# Patient Record
Sex: FEMALE | Race: OTHER | URBAN - METROPOLITAN AREA
[De-identification: names, ages, dates, MRNs, and addresses within clinical notes are randomized per-mention and may not be internally consistent; named-entity substitution may affect disease eponyms.]

---

## 2020-04-02 ENCOUNTER — INPATIENT (INPATIENT)
Facility: HOSPITAL | Age: 35
LOS: 0 days | Discharge: HOME | DRG: 831 | End: 2020-04-03
Attending: OBSTETRICS & GYNECOLOGY | Admitting: OBSTETRICS & GYNECOLOGY
Payer: COMMERCIAL

## 2020-04-02 VITALS
HEART RATE: 117 BPM | WEIGHT: 184.09 LBS | HEIGHT: 63 IN | OXYGEN SATURATION: 94 % | DIASTOLIC BLOOD PRESSURE: 90 MMHG | TEMPERATURE: 98 F | SYSTOLIC BLOOD PRESSURE: 139 MMHG | RESPIRATION RATE: 18 BRPM

## 2020-04-02 DIAGNOSIS — Z98.82 BREAST IMPLANT STATUS: Chronic | ICD-10-CM

## 2020-04-02 DIAGNOSIS — Z98.891 HISTORY OF UTERINE SCAR FROM PREVIOUS SURGERY: Chronic | ICD-10-CM

## 2020-04-02 LAB
ALBUMIN SERPL ELPH-MCNC: 3.1 G/DL — LOW (ref 3.3–5)
ALBUMIN SERPL ELPH-MCNC: 3.2 G/DL — LOW (ref 3.3–5)
ALP SERPL-CCNC: 142 U/L — HIGH (ref 40–120)
ALP SERPL-CCNC: 146 U/L — HIGH (ref 40–120)
ALT FLD-CCNC: 31 U/L — SIGNIFICANT CHANGE UP (ref 10–45)
ALT FLD-CCNC: SIGNIFICANT CHANGE UP U/L (ref 10–45)
ANION GAP SERPL CALC-SCNC: 11 MMOL/L — SIGNIFICANT CHANGE UP (ref 5–17)
ANION GAP SERPL CALC-SCNC: 12 MMOL/L — SIGNIFICANT CHANGE UP (ref 5–17)
APTT BLD: 35 SEC — SIGNIFICANT CHANGE UP (ref 27.5–36.3)
AST SERPL-CCNC: 41 U/L — HIGH (ref 10–40)
AST SERPL-CCNC: SIGNIFICANT CHANGE UP U/L (ref 10–40)
BASOPHILS # BLD AUTO: 0.01 K/UL — SIGNIFICANT CHANGE UP (ref 0–0.2)
BASOPHILS NFR BLD AUTO: 0.2 % — SIGNIFICANT CHANGE UP (ref 0–2)
BILIRUB SERPL-MCNC: 0.2 MG/DL — SIGNIFICANT CHANGE UP (ref 0.2–1.2)
BILIRUB SERPL-MCNC: 0.2 MG/DL — SIGNIFICANT CHANGE UP (ref 0.2–1.2)
BLD GP AB SCN SERPL QL: NEGATIVE — SIGNIFICANT CHANGE UP
BUN SERPL-MCNC: 5 MG/DL — LOW (ref 7–23)
BUN SERPL-MCNC: 6 MG/DL — LOW (ref 7–23)
CALCIUM SERPL-MCNC: 8.2 MG/DL — LOW (ref 8.4–10.5)
CALCIUM SERPL-MCNC: 8.4 MG/DL — SIGNIFICANT CHANGE UP (ref 8.4–10.5)
CHLORIDE SERPL-SCNC: 102 MMOL/L — SIGNIFICANT CHANGE UP (ref 96–108)
CHLORIDE SERPL-SCNC: 103 MMOL/L — SIGNIFICANT CHANGE UP (ref 96–108)
CO2 SERPL-SCNC: 22 MMOL/L — SIGNIFICANT CHANGE UP (ref 22–31)
CO2 SERPL-SCNC: 22 MMOL/L — SIGNIFICANT CHANGE UP (ref 22–31)
CREAT SERPL-MCNC: 0.5 MG/DL — SIGNIFICANT CHANGE UP (ref 0.5–1.3)
CREAT SERPL-MCNC: 0.52 MG/DL — SIGNIFICANT CHANGE UP (ref 0.5–1.3)
CRP SERPL-MCNC: 8.78 MG/DL — HIGH (ref 0–0.4)
D DIMER BLD IA.RAPID-MCNC: 502 NG/ML DDU — HIGH
EOSINOPHIL # BLD AUTO: 0 K/UL — SIGNIFICANT CHANGE UP (ref 0–0.5)
EOSINOPHIL NFR BLD AUTO: 0 % — SIGNIFICANT CHANGE UP (ref 0–6)
FERRITIN SERPL-MCNC: 225 NG/ML — HIGH (ref 15–150)
GLUCOSE SERPL-MCNC: 94 MG/DL — SIGNIFICANT CHANGE UP (ref 70–99)
GLUCOSE SERPL-MCNC: 99 MG/DL — SIGNIFICANT CHANGE UP (ref 70–99)
HCT VFR BLD CALC: 41.1 % — SIGNIFICANT CHANGE UP (ref 34.5–45)
HGB BLD-MCNC: 13.7 G/DL — SIGNIFICANT CHANGE UP (ref 11.5–15.5)
IMM GRANULOCYTES NFR BLD AUTO: 0.3 % — SIGNIFICANT CHANGE UP (ref 0–1.5)
INR BLD: 0.96 — SIGNIFICANT CHANGE UP (ref 0.88–1.16)
LACTATE SERPL-SCNC: 1.4 MMOL/L — SIGNIFICANT CHANGE UP (ref 0.5–2)
LEGIONELLA AG UR QL: NEGATIVE — SIGNIFICANT CHANGE UP
LYMPHOCYTES # BLD AUTO: 1.32 K/UL — SIGNIFICANT CHANGE UP (ref 1–3.3)
LYMPHOCYTES # BLD AUTO: 21.3 % — SIGNIFICANT CHANGE UP (ref 13–44)
MCHC RBC-ENTMCNC: 31.8 PG — SIGNIFICANT CHANGE UP (ref 27–34)
MCHC RBC-ENTMCNC: 33.3 GM/DL — SIGNIFICANT CHANGE UP (ref 32–36)
MCV RBC AUTO: 95.4 FL — SIGNIFICANT CHANGE UP (ref 80–100)
MONOCYTES # BLD AUTO: 0.19 K/UL — SIGNIFICANT CHANGE UP (ref 0–0.9)
MONOCYTES NFR BLD AUTO: 3.1 % — SIGNIFICANT CHANGE UP (ref 2–14)
NEUTROPHILS # BLD AUTO: 4.67 K/UL — SIGNIFICANT CHANGE UP (ref 1.8–7.4)
NEUTROPHILS NFR BLD AUTO: 75.1 % — SIGNIFICANT CHANGE UP (ref 43–77)
NRBC # BLD: 0 /100 WBCS — SIGNIFICANT CHANGE UP (ref 0–0)
PCO2 BLDA: SIGNIFICANT CHANGE UP MMHG (ref 32–45)
PCO2 BLDV: 36 MMHG — LOW (ref 41–51)
PH BLDA: SIGNIFICANT CHANGE UP (ref 7.35–7.45)
PH BLDV: 7.4 — SIGNIFICANT CHANGE UP (ref 7.32–7.43)
PLATELET # BLD AUTO: 208 K/UL — SIGNIFICANT CHANGE UP (ref 150–400)
PO2 BLDA: SIGNIFICANT CHANGE UP MMHG (ref 83–108)
PO2 BLDV: 56 MMHG — SIGNIFICANT CHANGE UP
POTASSIUM SERPL-MCNC: 3.5 MMOL/L — SIGNIFICANT CHANGE UP (ref 3.5–5.3)
POTASSIUM SERPL-MCNC: SIGNIFICANT CHANGE UP MMOL/L (ref 3.5–5.3)
POTASSIUM SERPL-SCNC: 3.5 MMOL/L — SIGNIFICANT CHANGE UP (ref 3.5–5.3)
POTASSIUM SERPL-SCNC: SIGNIFICANT CHANGE UP MMOL/L (ref 3.5–5.3)
PROT SERPL-MCNC: 6.5 G/DL — SIGNIFICANT CHANGE UP (ref 6–8.3)
PROT SERPL-MCNC: 7 G/DL — SIGNIFICANT CHANGE UP (ref 6–8.3)
PROTHROM AB SERPL-ACNC: 10.9 SEC — SIGNIFICANT CHANGE UP (ref 10–12.9)
RBC # BLD: 4.31 M/UL — SIGNIFICANT CHANGE UP (ref 3.8–5.2)
RBC # FLD: 12.4 % — SIGNIFICANT CHANGE UP (ref 10.3–14.5)
RH IG SCN BLD-IMP: POSITIVE — SIGNIFICANT CHANGE UP
RH IG SCN BLD-IMP: POSITIVE — SIGNIFICANT CHANGE UP
SAO2 % BLDA: SIGNIFICANT CHANGE UP % (ref 95–100)
SAO2 % BLDV: 88 % — SIGNIFICANT CHANGE UP
SODIUM SERPL-SCNC: 136 MMOL/L — SIGNIFICANT CHANGE UP (ref 135–145)
SODIUM SERPL-SCNC: 136 MMOL/L — SIGNIFICANT CHANGE UP (ref 135–145)
WBC # BLD: 6.21 K/UL — SIGNIFICANT CHANGE UP (ref 3.8–10.5)
WBC # FLD AUTO: 6.21 K/UL — SIGNIFICANT CHANGE UP (ref 3.8–10.5)

## 2020-04-02 PROCEDURE — 71045 X-RAY EXAM CHEST 1 VIEW: CPT | Mod: 26

## 2020-04-02 PROCEDURE — 93010 ELECTROCARDIOGRAM REPORT: CPT

## 2020-04-02 PROCEDURE — 99285 EMERGENCY DEPT VISIT HI MDM: CPT | Mod: 25

## 2020-04-02 PROCEDURE — 93976 VASCULAR STUDY: CPT | Mod: 26

## 2020-04-02 PROCEDURE — 99223 1ST HOSP IP/OBS HIGH 75: CPT

## 2020-04-02 RX ORDER — FAMOTIDINE 10 MG/ML
20 INJECTION INTRAVENOUS DAILY
Refills: 0 | Status: DISCONTINUED | OUTPATIENT
Start: 2020-04-02 | End: 2020-04-03

## 2020-04-02 RX ORDER — ACETAMINOPHEN 500 MG
1000 TABLET ORAL EVERY 8 HOURS
Refills: 0 | Status: DISCONTINUED | OUTPATIENT
Start: 2020-04-02 | End: 2020-04-03

## 2020-04-02 RX ORDER — FOLIC ACID 0.8 MG
1 TABLET ORAL DAILY
Refills: 0 | Status: DISCONTINUED | OUTPATIENT
Start: 2020-04-02 | End: 2020-04-03

## 2020-04-02 RX ORDER — AZITHROMYCIN 500 MG/1
500 TABLET, FILM COATED ORAL ONCE
Refills: 0 | Status: COMPLETED | OUTPATIENT
Start: 2020-04-02 | End: 2020-04-02

## 2020-04-02 RX ORDER — FERROUS SULFATE 325(65) MG
325 TABLET ORAL DAILY
Refills: 0 | Status: DISCONTINUED | OUTPATIENT
Start: 2020-04-02 | End: 2020-04-03

## 2020-04-02 RX ADMIN — Medication 1 MILLIGRAM(S): at 18:52

## 2020-04-02 RX ADMIN — AZITHROMYCIN 255 MILLIGRAM(S): 500 TABLET, FILM COATED ORAL at 17:15

## 2020-04-02 RX ADMIN — Medication 1 TABLET(S): at 18:53

## 2020-04-02 RX ADMIN — Medication 325 MILLIGRAM(S): at 18:52

## 2020-04-02 RX ADMIN — Medication 1000 MILLIGRAM(S): at 21:05

## 2020-04-02 RX ADMIN — FAMOTIDINE 20 MILLIGRAM(S): 10 INJECTION INTRAVENOUS at 19:45

## 2020-04-02 NOTE — H&P ADULT - NSHPLABSRESULTS_GEN_ALL_CORE
13.7   6.21  )-----------( 208      ( 02 Apr 2020 10:57 )             41.1     Comprehensive Metabolic Panel (04.02.20 @ 10:57)    Sodium, Serum: 136 mmol/L    Potassium, Serum: see note: sample hemolzyed mmol/L    Chloride, Serum: 102 mmol/L    Carbon Dioxide, Serum: 22 mmol/L    Anion Gap, Serum: 12 mmol/L    Blood Urea Nitrogen, Serum: 5 mg/dL    Creatinine, Serum: 0.52 mg/dL    Glucose, Serum: 94 mg/dL    Calcium, Total Serum: 8.4 mg/dL    Protein Total, Serum: 7.0 g/dL    Albumin, Serum: 3.2 g/dL    Bilirubin Total, Serum: 0.2 mg/dL    Alkaline Phosphatase, Serum: 146 U/L    Aspartate Aminotransferase (AST/SGOT): see note: sample hemolzyed U/L    Alanine Aminotransferase (ALT/SGPT): see ntoe: sample hemolzyed U/L     < from: Xray Chest 1 View-PORTABLE IMMEDIATE (04.02.20 @ 10:35) >    FINDINGS: No cardiomegaly. No pleural effusion. No pneumothorax. Large breasts with bilateral implants are limiting assessment of underlying lung fields. However suspect underlying lung opacification particularly peripherally in the bilateral mid and lower zones.    IMPRESSION:  Study is limited by patient's large breasts with implants however suspect bilateral mid and lower zone lung opacification which is suspicious for  Covid  19 infection.. Findings can be correlated with CT.        < end of copied text >

## 2020-04-02 NOTE — H&P ADULT - HISTORY OF PRESENT ILLNESS
Patient evaluated at bedside.   36yo  @ 30.0wk with RICCO 20 resenting to ED for 9 days of body aches, subjective fevers and cough. Patient denies contact with covid+ however reports that her sister has experienced similar symptoms and that she may have had some positive exposure. Patient reporting shortness of breath upon arrival to ED. Patient also experiencing inability to smell or taste for a week.    Patient denies abdominal pain, contractions, nausea, vomiting, vaginal bleeding, LOF. Reporting normal fetal movement.     course with Dr. Conner out of [facility]. Patient reporting passing GCT, otherwise uncomplicated pregnancy. Patient has not missed appointment with Ob. Patient denies h/o elevated BP this pregnancy    OBHx: G1- c/s 2017 at [facility] c/b gHTN never on antihypertensives, no h/o PEC  G2-current  GYN Hx: denies F/C/STIs  PMHx: denies  SHx: c/s x 1, breast augmentation   Meds: PNV  Allergies: NKDA

## 2020-04-02 NOTE — CONSULT NOTE ADULT - SUBJECTIVE AND OBJECTIVE BOX
HPI:  Patient evaluated at bedside.   34yo  @ 30.0wk with RICCO 20 resenting to ED for 9 days of body aches, subjective fevers and cough. Patient denies contact with covid+ however reports that her sister has experienced similar symptoms and that she may have had some positive exposure. Patient reporting shortness of breath upon arrival to ED. Patient also experiencing inability to smell or taste for a week.    Patient denies abdominal pain, contractions, nausea, vomiting, vaginal bleeding, LOF. Reporting normal fetal movement.     course with Dr. Conner out of [facility]. Patient reporting passing GCT, otherwise uncomplicated pregnancy. Patient has not missed appointment with Ob. Patient denies h/o elevated BP this pregnancy    OBHx: G1- c/s 2017 at [facility] c/b gHTN never on antihypertensives, no h/o PEC  G2-current  GYN Hx: denies F/C/STIs  PMHx: denies  SHx: c/s x 1, breast augmentation   Meds: PNV  Allergies: NKDA (2020 12:38)      PAST MEDICAL & SURGICAL HISTORY:  H/O breast augmentation  H/O  section        REVIEW OF SYSTEMS:    General:	 no weakness; no fevers, no chills  Skin/Breast: no rash  Respiratory and Thorax: + SOB, no cough  Cardiovascular:	No chest pain  Gastrointestinal:	 no nausea, vomiting , diarrhea  Genitourinary:	no dysuria, no difficulty urinating, no hematuria  Musculoskeletal:	no weakness, no joint swelling/pain  Neurological:	no focal weakness/numbness  Endocrine:	no polyuria, no polydipsia      ANTIBIOTICS:  MEDICATIONS  (STANDING):  ferrous    sulfate 325 milliGRAM(s) Oral daily  folic acid 1 milliGRAM(s) Oral daily  prenatal multivitamin 1 Tablet(s) Oral daily    MEDICATIONS  (PRN):      Allergies    No Known Allergies    Intolerances        SOCIAL HISTORY:    FAMILY HISTORY:      Vital Signs Last 24 Hrs  T(C): 36.8 (2020 11:28), Max: 36.8 (2020 09:17)  T(F): 98.3 (2020 11:28), Max: 98.3 (2020 09:17)  HR: 103 (2020 11:28) (103 - 117)  BP: 116/72 (2020 11:28) (116/72 - 139/90)  BP(mean): --  RR: 18 (2020 11:28) (18 - 18)  SpO2: 96% (2020 11:28) (94% - 96%)    PHYSICAL EXAM:  Constitutional:Well-developed, well nourished  Eyes:ROBERT, EOMI  Ear/Nose/Throat: no oral lesion, no sinus tenderness on percussion	  Neck:no JVD, no lymphadenopathy, supple  Respiratory: CTA maikol  Cardiovascular: S1S2 RRR, no murmurs  Gastrointestinal:soft, (+) BS, no HSM  Extremities:no e/e/c  Vascular: DP Pulse:	right normal; left normal            LABS:                        13.7   6.21  )-----------( 208      ( 2020 10:57 )             41.1     04-    136  |  103  |  6<L>  ----------------------------<  99  3.5   |  22  |  0.50    Ca    8.2<L>      2020 12:29    TPro  6.5  /  Alb  3.1<L>  /  TBili  0.2  /  DBili  x   /  AST  41<H>  /  ALT  31  /  AlkPhos  142<H>  04-02    PT/INR - ( 2020 10:57 )   PT: 10.9 sec;   INR: 0.96          PTT - ( 2020 10:57 )  PTT:35.0 sec      MICROBIOLOGY: pending  RADIOLOGY & ADDITIONAL STUDIES: CXR b/l opacities

## 2020-04-02 NOTE — H&P ADULT - ASSESSMENT
36yo  @ 30.0wk with RICCO 20 resenting to ED for 9 days of body aches, subjective fevers and cough r/o Covid-19 infection. Patient currently saturating 95-96% on 3LPM NC with some increased respirations. Patient currently afebrile. High suspicion given clinical picture however labs and CXR pending. Overall reassuring fetal status, given patients increased O2 demand   -admit to antepartum unit for supportive care  -Covid 19 testing pending   -VS q 4 hours  -Oxygen therapy as needed  -ID consult- appreciate recommendations  -MFM consulted  -NST TID    ** will hold on obstretical intervention at this time as fetal status reassuring   d/w Dr. Casillas & Dr. Christiansen [facility]

## 2020-04-02 NOTE — CONSULT NOTE ADULT - SUBJECTIVE AND OBJECTIVE BOX
Patient evaluated at bedside.   34yo  @ 32.6wk with RICCO 20 resenting to ED for 3 days of body aches, subjective fevers and cough. Patient denies contact with covid+ however reports that her sister has experienced similar symptoms and that she may have had some positive exposure. Patient reporting shortness of breath upon arrival to ED. Patient also experiencing inability to smell or taste for a week.    Patient denies abdominal pain, contractions, nausea, vomiting, vaginal bleeding, LOF. Reporting normal fetal movement.     course with Dr. Conner out of [facility]. Patient reporting passing GCT, otherwise uncomplicated pregnancy. Patient has not missed appointment with Ob. Patient denies h/o elevated BP this pregnancy    OBHx: G1- c/s 2017 at [facility] c/b gHTN never on antihypertensives, no h/o PEC  G2-current  GYN Hx: denies F/C/STIs  PMHx: denies  SHx: c/s x 1  Meds: PNV  Allergies: NKDA    PHYSICAL EXAM:   Vital Signs Last 24 Hrs  T(C): 36.8 (2020 09:17), Max: 36.8 (2020 09:17)  T(F): 98.3 (2020 09:17), Max: 98.3 (2020 09:17)  HR: 117 (2020 09:17) (117 - 117)  BP: 139/90 (2020 09:17) (139/90 - 139/90)  BP(mean): --  RR: 18 (2020 09:17) (18 - 18)  SpO2: 94% (2020 09:17) (94% - 94%)    **************************  Constitutional: Alert & Oriented x3, No acute distress  Respiratory: Patient taking shallow breaths, no accessory muscles used for inspiration   Cardiovascular: tachycardic to 117  Gastrointestinal: soft, gravid non tender, positive bowel sounds, no rebound or guarding   Pelvic exam: deferred  TAUS bedside: cephalic, TIMI 12.3, anterior placenta,  on Doppler, BPP 6/6, await bedside NST  Extremities: no calf tenderness or swelling      LABS: pending                   RADIOLOGY & ADDITIONAL STUDIES: Patient evaluated at bedside.   36yo  @ 30.0wk with RICCO 20 resenting to ED for 9 days of body aches, subjective fevers and cough. Patient denies contact with covid+ however reports that her sister has experienced similar symptoms and that she may have had some positive exposure. Patient reporting shortness of breath upon arrival to ED. Patient also experiencing inability to smell or taste for a week.    Patient denies abdominal pain, contractions, nausea, vomiting, vaginal bleeding, LOF. Reporting normal fetal movement.     course with Dr. Conner out of [facility]. Patient reporting passing GCT, otherwise uncomplicated pregnancy. Patient has not missed appointment with Ob. Patient denies h/o elevated BP this pregnancy    OBHx: G1- c/s 2017 at [facility] c/b gHTN never on antihypertensives, no h/o PEC  G2-current  GYN Hx: denies F/C/STIs  PMHx: denies  SHx: c/s x 1, breast augmentation   Meds: PNV  Allergies: NKDA    PHYSICAL EXAM:   Vital Signs Last 24 Hrs  T(C): 36.8 (2020 09:17), Max: 36.8 (2020 09:17)  T(F): 98.3 (2020 09:17), Max: 98.3 (2020 09:17)  HR: 117 (2020 09:17) (117 - 117)  BP: 139/90 (2020 09:17) (139/90 - 139/90)  BP(mean): --  RR: 18 (2020 09:17) (18 - 18)  SpO2: 94% (2020 09:17) (94% - 94%)    **************************  Constitutional: Alert & Oriented x3, No acute distress  Respiratory: Patient taking shallow breaths, no accessory muscles used for inspiration   Cardiovascular: tachycardic to 117  Gastrointestinal: soft, gravid non tender, positive bowel sounds, no rebound or guarding   Pelvic exam: deferred  TAUS bedside: cephalic, TIMI 12.3, anterior placenta,  on Doppler, BPP 8/8  NST: baseline 140 bpm, moderate variability, accels present, no decels noted- 30 min NST performed   Extremities: no calf tenderness or swelling      LABS: pending                   RADIOLOGY & ADDITIONAL STUDIES:

## 2020-04-02 NOTE — CONSULT NOTE ADULT - ASSESSMENT
34yo  @ 32.6wk with RICCO 20 resenting to ED for 3 days of body aches, subjective fevers and cough r/o Covid-19 infection. Patient currently saturating 95-96% on 3LPM NC with some increased respirations. Patient currently afebrile. High suspicion given clinical picture however labs and CXR pending. Overall reassuring fetal status 34yo  @ 32.6wk with RICCO 20 resenting to ED for 9 days of body aches, subjective fevers and cough r/o Covid-19 infection. Patient currently saturating 95-96% on 3LPM NC with some increased respirations. Patient currently afebrile. High suspicion given clinical picture however labs and CXR pending. Overall reassuring fetal status, given patients increased o2 demand would reccomend getting Covid team on board and anticipate admission for supportive care and treatment     d/w Dr. Casillas

## 2020-04-02 NOTE — ED PROVIDER NOTE - PROGRESS NOTE DETAILS
Ob contacted and case discussed. Will send someone to ED to [facility]. CXR c/w likely covid.  Pt w hemolyzed cmp - repeat pending.  Pt seen by and discussed w ob/gyn - pt tba to gyn, 6th floor.  GYN requests abg - will draw, gyn to fu on results.

## 2020-04-02 NOTE — ED ADULT NURSE NOTE - OBJECTIVE STATEMENT
34 y/o female, , 32 weeks pregnant presents to the ED c/o 6 days of body aches associated w/ subjective fever, mild SOB, and dry cough. Denies chills, abd pain, NVD, back pain, vaginal bleeding, and any other sx.

## 2020-04-02 NOTE — ED PROVIDER NOTE - OBJECTIVE STATEMENT
36 y/o F  32 wks pregnant has 6 dys of body ache, 3dys of fever and SOB, and today has dry cough. No abd pain, N/V/D, fluid leakage, bleeding. Pt is able to feel baby move. No complications with pregnancy. Due date is on . No sick contact or recent travel. 34 y/o F  30 wks pregnant c/o 6 d myalgias, chills, 3d fever and SOB, and today has dry cough. No abd pain, N/V/D, vaginal fluid leakage, vaginal bleeding. Pt is able to feel baby move. No complications with pregnancy. Due date is on . No sick contact or recent travel.  Pt took tylenol last pm w relief.

## 2020-04-02 NOTE — CONSULT NOTE ADULT - ASSESSMENT
34 yo female at 30 weeks gestation, p/w 10 days of cough, fever, myalgia, CXR b/l infiltrates all suspicious for COVID-19 infection. Clinically improving.  - f/u COVID-19 PCR  - check urine Legionella Ag and give azithromycin 500mg IV X 1; if Legionella Ag negative, no need to redose  - supportive care; risks of hydroxychloroquine outweigh benefits    ID Team 2

## 2020-04-02 NOTE — ED PROVIDER NOTE - DIAGNOSTIC INTERPRETATION
b/l infiltrate. limited by breath. nml heart and bones. ER Physician:  Brenda Director  CHEST XRAY INTERPRETATION: lungs w poss bilat lower lobe infiltrates vs breast implants, heart shadow normal, bony structures intact

## 2020-04-02 NOTE — ED PROVIDER NOTE - CLINICAL SUMMARY MEDICAL DECISION MAKING FREE TEXT BOX
34 y/o F  32 wks pregnant p/w COVID like sxs. Stat 94% on room air upon arrival. Will obtain labs, cxr, covid swab, and ob consult. Fetal heart rate is 164 bpm.

## 2020-04-03 VITALS — TEMPERATURE: 99 F

## 2020-04-03 LAB — SARS-COV-2 RNA SPEC QL NAA+PROBE: DETECTED

## 2020-04-03 PROCEDURE — 82803 BLOOD GASES ANY COMBINATION: CPT

## 2020-04-03 PROCEDURE — 85610 PROTHROMBIN TIME: CPT

## 2020-04-03 PROCEDURE — 83605 ASSAY OF LACTIC ACID: CPT

## 2020-04-03 PROCEDURE — 86140 C-REACTIVE PROTEIN: CPT

## 2020-04-03 PROCEDURE — 93976 VASCULAR STUDY: CPT

## 2020-04-03 PROCEDURE — 87449 NOS EACH ORGANISM AG IA: CPT

## 2020-04-03 PROCEDURE — 87389 HIV-1 AG W/HIV-1&-2 AB AG IA: CPT

## 2020-04-03 PROCEDURE — 99285 EMERGENCY DEPT VISIT HI MDM: CPT

## 2020-04-03 PROCEDURE — 80053 COMPREHEN METABOLIC PANEL: CPT

## 2020-04-03 PROCEDURE — 85025 COMPLETE CBC W/AUTO DIFF WBC: CPT

## 2020-04-03 PROCEDURE — 99231 SBSQ HOSP IP/OBS SF/LOW 25: CPT

## 2020-04-03 PROCEDURE — 87635 SARS-COV-2 COVID-19 AMP PRB: CPT

## 2020-04-03 PROCEDURE — 82728 ASSAY OF FERRITIN: CPT

## 2020-04-03 PROCEDURE — 93005 ELECTROCARDIOGRAM TRACING: CPT

## 2020-04-03 PROCEDURE — 85379 FIBRIN DEGRADATION QUANT: CPT

## 2020-04-03 PROCEDURE — 86901 BLOOD TYPING SEROLOGIC RH(D): CPT

## 2020-04-03 PROCEDURE — 84550 ASSAY OF BLOOD/URIC ACID: CPT

## 2020-04-03 PROCEDURE — 85730 THROMBOPLASTIN TIME PARTIAL: CPT

## 2020-04-03 PROCEDURE — 36415 COLL VENOUS BLD VENIPUNCTURE: CPT

## 2020-04-03 PROCEDURE — 71045 X-RAY EXAM CHEST 1 VIEW: CPT

## 2020-04-03 PROCEDURE — 86850 RBC ANTIBODY SCREEN: CPT

## 2020-04-03 PROCEDURE — 83615 LACTATE (LD) (LDH) ENZYME: CPT

## 2020-04-03 RX ORDER — BENZOYL PEROXIDE MICRONIZED 5.8 %
1 TOWELETTE (EA) TOPICAL
Qty: 0 | Refills: 0 | DISCHARGE

## 2020-04-03 RX ORDER — ASPIRIN/CALCIUM CARB/MAGNESIUM 324 MG
1 TABLET ORAL
Qty: 0 | Refills: 0 | DISCHARGE

## 2020-04-03 RX ADMIN — Medication 1 MILLIGRAM(S): at 09:02

## 2020-04-03 RX ADMIN — Medication 1000 MILLIGRAM(S): at 09:36

## 2020-04-03 RX ADMIN — FAMOTIDINE 20 MILLIGRAM(S): 10 INJECTION INTRAVENOUS at 09:02

## 2020-04-03 RX ADMIN — Medication 1 TABLET(S): at 09:02

## 2020-04-03 RX ADMIN — Medication 325 MILLIGRAM(S): at 09:02

## 2020-04-03 NOTE — PROGRESS NOTE ADULT - SUBJECTIVE AND OBJECTIVE BOX
INTERVAL HPI/OVERNIGHT EVENTS: Denies fever, cough, or SOB.    CONSTITUTIONAL:  Negative fever or chills, feels well, good appetite  EYES:  Negative  blurry vision or double vision  CARDIOVASCULAR:  Negative for chest pain or palpitations  RESPIRATORY:  Negative for cough, wheezing, or SOB   GASTROINTESTINAL:  Negative for nausea, vomiting, diarrhea, constipation, or abdominal pain  GENITOURINARY:  Negative frequency, urgency or dysuria  NEUROLOGIC:  No headache, confusion, dizziness, lightheadedness      ANTIBIOTICS/RELEVANT:    MEDICATIONS  (STANDING):  famotidine    Tablet 20 milliGRAM(s) Oral daily  ferrous    sulfate 325 milliGRAM(s) Oral daily  folic acid 1 milliGRAM(s) Oral daily  prenatal multivitamin 1 Tablet(s) Oral daily    MEDICATIONS  (PRN):  acetaminophen   Tablet .. 1000 milliGRAM(s) Oral every 8 hours PRN Temp greater or equal to 38C (100.4F), Mild Pain (1 - 3)        Vital Signs Last 24 Hrs  T(C): 37.4 (03 Apr 2020 10:04), Max: 38 (02 Apr 2020 18:56)  T(F): 99.4 (03 Apr 2020 10:04), Max: 100.4 (02 Apr 2020 18:56)  HR: 120 (02 Apr 2020 18:56) (120 - 120)  BP: 128/74 (02 Apr 2020 18:56) (128/74 - 128/74)  BP(mean): --  RR: 18 (02 Apr 2020 18:56) (18 - 18)  SpO2: 97% (02 Apr 2020 18:56) (97% - 97%)        LABS:                        13.7   6.21  )-----------( 208      ( 02 Apr 2020 10:57 )             41.1     04-02    136  |  103  |  6<L>  ----------------------------<  99  3.5   |  22  |  0.50    Ca    8.2<L>      02 Apr 2020 12:29    TPro  6.5  /  Alb  3.1<L>  /  TBili  0.2  /  DBili  x   /  AST  41<H>  /  ALT  31  /  AlkPhos  142<H>  04-02    PT/INR - ( 02 Apr 2020 10:57 )   PT: 10.9 sec;   INR: 0.96          PTT - ( 02 Apr 2020 10:57 )  PTT:35.0 sec      MICROBIOLOGY: COVID-19 PCR . (04.02.20 @ 11:19)    COVID-19 PCR: Detected: This test has been validated by [employer] to be accurate;  though it has not been FDA cleared/approved by the usual pathway.  As with all laboratory tests, results should be correlated with clinical  findings.  https://www.fda.gov/media/680624/download  https://www.fda.gov/media/605776/download    Legionella pneumophila Antigen, Urine (04.02.20 @ 17:14)    Legionella Antigen, Urine: Negative: Please submit a first morning sputum specimen for Legionella culture.        RADIOLOGY & ADDITIONAL STUDIES: reviewed

## 2020-04-03 NOTE — DISCHARGE NOTE ANTEPARTUM - HOSPITAL COURSE
Patient admitted for respiratory infection concerning for Covid-19 infection, she tested positive and was kept on isolation. The patient had a CXR also suggestive of Covid infection, however remained stable w/ normal O2 sat on room air. All fetal testing was reassuring. The ID covid team was consulted and the patient did not receive any treatment for covid per the team.

## 2020-04-03 NOTE — DISCHARGE NOTE ANTEPARTUM - CARE PLAN
Principal Discharge DX:	Pregnancy  Goal:	healthy full term pregnancy  Assessment and plan of treatment:	f/u with OB no earlier than 7 days from the last day of symptoms. Call first to make an appointment. Kick counts twice daily. Call your doctor for contractions, vaginal bleeding, leakage of fluid, or decreased fetal movement.  Secondary Diagnosis:	Coronavirus infection  Goal:	safe recovery at home  Assessment and plan of treatment:	Self isolate at home, Tylenol as needed for fever/pain, wear a mask when around others in the house.

## 2020-04-03 NOTE — DISCHARGE NOTE ANTEPARTUM - PLAN OF CARE
healthy full term pregnancy f/u with OB no earlier than 7 days from the last day of symptoms. Call first to make an appointment. Kick counts twice daily. Call your doctor for contractions, vaginal bleeding, leakage of fluid, or decreased fetal movement. safe recovery at home Self isolate at home, Tylenol as needed for fever/pain, wear a mask when around others in the house.

## 2020-04-03 NOTE — PROGRESS NOTE ADULT - ASSESSMENT
36 yo female at 30 weeks gestation, p/w 10 days of cough, fever, myalgia, CXR b/l infiltrates found to have COVID-19 infection. Clinically improved.  - supportive care; risks of hydroxychloroquine outweigh benefits    Please reconsult with ?    ID Team 2

## 2020-04-03 NOTE — DISCHARGE NOTE ANTEPARTUM - MEDICATION SUMMARY - MEDICATIONS TO TAKE
I will START or STAY ON the medications listed below when I get home from the hospital:    Prenatal Multivitamins with Folic Acid 1 mg oral tablet  -- 1 tab(s) by mouth once a day  -- Indication: For Pregnancy

## 2020-04-03 NOTE — DISCHARGE NOTE ANTEPARTUM - PATIENT PORTAL LINK FT
You can access the FollowMyHealth Patient Portal offered by [facility] by registering at the following website: [email or web address]. By joining [facility]’s FollowMyHealth portal, you will also be able to view your health information using other applications (apps) compatible with our system.

## 2020-04-03 NOTE — DISCHARGE NOTE ANTEPARTUM - CARE PROVIDER_API CALL
Bhargavi Casillas)  Gynecologic Oncology  [address] [place] [address]  Phone: [phone]  Fax: [phone]  Follow Up Time:

## 2020-04-07 DIAGNOSIS — U07.1 COVID-19: ICD-10-CM

## 2020-04-07 DIAGNOSIS — O98.513 OTHER VIRAL DISEASES COMPLICATING PREGNANCY, THIRD TRIMESTER: ICD-10-CM

## 2020-04-07 DIAGNOSIS — O13.3 GESTATIONAL [PREGNANCY-INDUCED] HYPERTENSION WITHOUT SIGNIFICANT PROTEINURIA, THIRD TRIMESTER: ICD-10-CM

## 2020-04-07 DIAGNOSIS — Z98.82 BREAST IMPLANT STATUS: ICD-10-CM

## 2020-04-07 DIAGNOSIS — Z3A.30 30 WEEKS GESTATION OF PREGNANCY: ICD-10-CM

## 2022-06-18 NOTE — H&P ADULT - NSHPPHYSICALEXAM_GEN_ALL_CORE
PHYSICAL EXAM:   Vital Signs Last 24 Hrs  T(C): 36.8 (02 Apr 2020 09:17), Max: 36.8 (02 Apr 2020 09:17)  T(F): 98.3 (02 Apr 2020 09:17), Max: 98.3 (02 Apr 2020 09:17)  HR: 117 (02 Apr 2020 09:17) (117 - 117)  BP: 139/90 (02 Apr 2020 09:17) (139/90 - 139/90)  BP(mean): --  RR: 18 (02 Apr 2020 09:17) (18 - 18)  SpO2: 94% (02 Apr 2020 09:17) (94% - 94%)    **************************  Constitutional: Alert & Oriented x3, No acute distress  Respiratory: Patient taking shallow breaths, no accessory muscles used for inspiration   Cardiovascular: tachycardic to 117  Gastrointestinal: soft, gravid non tender, positive bowel sounds, no rebound or guarding   Pelvic exam: deferred  TAUS bedside: cephalic, TIMI 12.3, anterior placenta,  on Doppler, BPP 8/8  NST: baseline 140 bpm, moderate variability, accels present, no decels noted- 30 min NST performed   Extremities: no calf tenderness or swelling
hand grasp, leg strength strong and equal bilaterally